# Patient Record
Sex: MALE | Race: WHITE | NOT HISPANIC OR LATINO | Employment: OTHER | URBAN - METROPOLITAN AREA
[De-identification: names, ages, dates, MRNs, and addresses within clinical notes are randomized per-mention and may not be internally consistent; named-entity substitution may affect disease eponyms.]

---

## 2022-12-14 ENCOUNTER — NEW PATIENT (OUTPATIENT)
Dept: URBAN - METROPOLITAN AREA CLINIC 36 | Facility: CLINIC | Age: 77
End: 2022-12-14

## 2022-12-14 PROCEDURE — 99199RRD RESIDENT RENDERING PROVIDER

## 2022-12-14 PROCEDURE — 92015 DETERMINE REFRACTIVE STATE: CPT

## 2022-12-14 PROCEDURE — 92004 COMPRE OPH EXAM NEW PT 1/>: CPT

## 2022-12-14 ASSESSMENT — VISUAL ACUITY
OS_SC: 20/30-2
OD_SC: 20/25
OU_SC: 20/25
OU_CC: 20/25
OD_CC: J1
OU_CC: J1
OD_CC: 20/25
OS_CC: J2
OS_CC: 20/30

## 2022-12-14 ASSESSMENT — TONOMETRY
OS_IOP_MMHG: 13
OD_IOP_MMHG: 13

## 2023-01-03 ENCOUNTER — CONSULTATION/EVALUATION (OUTPATIENT)
Dept: URBAN - METROPOLITAN AREA CLINIC 43 | Facility: CLINIC | Age: 78
End: 2023-01-03

## 2023-01-03 DIAGNOSIS — H25.813: ICD-10-CM

## 2023-01-03 DIAGNOSIS — H35.363: ICD-10-CM

## 2023-01-03 PROCEDURE — V2799PMN IMPRIMIS PRED-MOXI-NEPAF 5ML

## 2023-01-03 PROCEDURE — 99214 OFFICE O/P EST MOD 30 MIN: CPT

## 2023-01-03 PROCEDURE — 92136TC INTERFEROMETRY - TECHNICAL COMPONENT

## 2023-01-03 PROCEDURE — 92286 ANT SGM IMG I&R SPECLR MIC: CPT

## 2023-01-03 PROCEDURE — 92025-1 CORNEAL TOPOGRAPHY, INS

## 2023-01-03 PROCEDURE — 92250 FUNDUS PHOTOGRAPHY W/I&R: CPT

## 2023-01-03 PROCEDURE — 92134 CPTRZ OPH DX IMG PST SGM RTA: CPT

## 2023-01-03 ASSESSMENT — VISUAL ACUITY
OD_CC: J1
OS_CC: J2
OD_SC: 20/40
OS_CC: 20/20
OD_SC: J6
OD_CC: 20/20
OS_SC: 20/40+1
OS_SC: J12

## 2023-01-03 ASSESSMENT — TONOMETRY
OD_IOP_MMHG: 12
OS_IOP_MMHG: 15

## 2023-01-19 ENCOUNTER — SURGERY/PROCEDURE (OUTPATIENT)
Dept: URBAN - METROPOLITAN AREA CLINIC 43 | Facility: CLINIC | Age: 78
End: 2023-01-19

## 2023-01-19 DIAGNOSIS — H25.813: ICD-10-CM

## 2023-01-19 PROCEDURE — 66999LNSR LENSAR LASER FOR CAT SX

## 2023-01-19 PROCEDURE — 66984AV REMOVE CATARACT, INSERT ADVANCED LENS

## 2023-01-19 PROCEDURE — 65772LRI LRI DURING CAT SX

## 2023-01-20 ENCOUNTER — POST-OP (OUTPATIENT)
Dept: URBAN - METROPOLITAN AREA CLINIC 47 | Facility: CLINIC | Age: 78
End: 2023-01-20

## 2023-01-20 DIAGNOSIS — Z96.1: ICD-10-CM

## 2023-01-20 PROCEDURE — 99024 POSTOP FOLLOW-UP VISIT: CPT

## 2023-01-20 PROCEDURE — 99199RRD RESIDENT RENDERING PROVIDER

## 2023-01-20 ASSESSMENT — TONOMETRY
OD_IOP_MMHG: 11
OS_IOP_MMHG: 12

## 2023-01-20 ASSESSMENT — VISUAL ACUITY
OS_SC: J12
OD_SC: 20/20-1
OS_SC: 20/30-1
OD_SC: >J12

## 2023-01-25 ENCOUNTER — POST OP/EVAL OF SECOND EYE (OUTPATIENT)
Dept: URBAN - METROPOLITAN AREA CLINIC 36 | Facility: CLINIC | Age: 78
End: 2023-01-25

## 2023-01-25 DIAGNOSIS — Z96.1: ICD-10-CM

## 2023-01-25 DIAGNOSIS — H25.812: ICD-10-CM

## 2023-01-25 PROCEDURE — 92012 INTRM OPH EXAM EST PATIENT: CPT

## 2023-01-25 PROCEDURE — 99024 POSTOP FOLLOW-UP VISIT: CPT

## 2023-01-25 PROCEDURE — 99199RRD RESIDENT RENDERING PROVIDER

## 2023-01-25 ASSESSMENT — VISUAL ACUITY
OS_SC: J12
OD_SC: 20/20
OS_SC: 20/40+2
OU_SC: J12
OU_SC: 20/20
OD_SC: J12

## 2023-01-25 ASSESSMENT — TONOMETRY
OS_IOP_MMHG: 10
OD_IOP_MMHG: 10

## 2023-01-26 ENCOUNTER — SURGERY/PROCEDURE (OUTPATIENT)
Dept: URBAN - METROPOLITAN AREA CLINIC 43 | Facility: CLINIC | Age: 78
End: 2023-01-26

## 2023-01-26 DIAGNOSIS — H25.812: ICD-10-CM

## 2023-01-26 PROCEDURE — 66999LNSR LENSAR LASER FOR CAT SX

## 2023-01-26 PROCEDURE — 65772LRI LRI DURING CAT SX

## 2023-01-26 PROCEDURE — 66984AV REMOVE CATARACT, INSERT ADVANCED LENS

## 2023-01-27 ENCOUNTER — POST-OP (OUTPATIENT)
Dept: URBAN - METROPOLITAN AREA CLINIC 47 | Facility: CLINIC | Age: 78
End: 2023-01-27

## 2023-01-27 DIAGNOSIS — Z96.1: ICD-10-CM

## 2023-01-27 PROCEDURE — 99024 POSTOP FOLLOW-UP VISIT: CPT

## 2023-01-27 PROCEDURE — 99199RRD RESIDENT RENDERING PROVIDER

## 2023-01-27 ASSESSMENT — VISUAL ACUITY
OD_SC: J12
OS_SC: 20/30-2
OS_SC: J12
OU_SC: J12
OD_SC: 20/15-2
OU_SC: 20/20

## 2023-01-27 ASSESSMENT — TONOMETRY
OD_IOP_MMHG: 8
OS_IOP_MMHG: 10

## 2023-05-03 ENCOUNTER — POST-OP (OUTPATIENT)
Dept: URBAN - METROPOLITAN AREA CLINIC 36 | Facility: CLINIC | Age: 78
End: 2023-05-03

## 2023-05-03 DIAGNOSIS — H52.7: ICD-10-CM

## 2023-05-03 DIAGNOSIS — Z96.1: ICD-10-CM

## 2023-05-03 PROCEDURE — 99199RRD RESIDENT RENDERING PROVIDER

## 2023-05-03 PROCEDURE — 99024 POSTOP FOLLOW-UP VISIT: CPT

## 2023-05-03 ASSESSMENT — VISUAL ACUITY
OS_SC: 20/25
OD_SC: J10
OD_SC: 20/15
OS_SC: J1
OU_SC: 20/15
OU_SC: J1

## 2023-05-03 ASSESSMENT — TONOMETRY
OS_IOP_MMHG: 13
OD_IOP_MMHG: 11

## 2023-11-07 ENCOUNTER — COMPREHENSIVE EXAM (OUTPATIENT)
Dept: URBAN - METROPOLITAN AREA CLINIC 36 | Facility: CLINIC | Age: 78
End: 2023-11-07

## 2023-11-07 DIAGNOSIS — Z96.1: ICD-10-CM

## 2023-11-07 DIAGNOSIS — H40.013: ICD-10-CM

## 2023-11-07 DIAGNOSIS — H52.7: ICD-10-CM

## 2023-11-07 DIAGNOSIS — H43.813: ICD-10-CM

## 2023-11-07 DIAGNOSIS — H35.363: ICD-10-CM

## 2023-11-07 PROCEDURE — 92014 COMPRE OPH EXAM EST PT 1/>: CPT

## 2023-11-07 PROCEDURE — 92015 DETERMINE REFRACTIVE STATE: CPT

## 2023-11-07 ASSESSMENT — VISUAL ACUITY
OD_SC: 20/20
OS_SC: J2
OS_SC: 20/25-1
OD_SC: J8

## 2023-11-07 ASSESSMENT — TONOMETRY
OD_IOP_MMHG: 11
OS_IOP_MMHG: 11